# Patient Record
Sex: FEMALE | Race: BLACK OR AFRICAN AMERICAN | NOT HISPANIC OR LATINO | URBAN - METROPOLITAN AREA
[De-identification: names, ages, dates, MRNs, and addresses within clinical notes are randomized per-mention and may not be internally consistent; named-entity substitution may affect disease eponyms.]

---

## 2021-04-30 ENCOUNTER — OFFICE VISIT (OUTPATIENT)
Dept: URBAN - METROPOLITAN AREA CLINIC 31 | Facility: CLINIC | Age: 81
End: 2021-04-30

## 2021-04-30 VITALS
WEIGHT: 131 LBS | BODY MASS INDEX: 24.11 KG/M2 | SYSTOLIC BLOOD PRESSURE: 140 MMHG | HEIGHT: 62 IN | DIASTOLIC BLOOD PRESSURE: 70 MMHG | HEART RATE: 82 BPM | OXYGEN SATURATION: 96 %

## 2021-04-30 RX ORDER — MULTIVITAMIN WITH IRON
AS DIRECTED TABLET ORAL
Status: ACTIVE | COMMUNITY

## 2021-04-30 RX ORDER — AMLODIPINE BESYLATE 10 MG/1
1 TABLET TABLET ORAL ONCE A DAY
Qty: 30 | Status: ACTIVE | COMMUNITY

## 2021-04-30 RX ORDER — OMEPRAZOLE 20 MG/1
1 CAPSULE CAPSULE, DELAYED RELEASE ORAL ONCE A DAY
Qty: 90 CAPSULE | Status: ON HOLD | COMMUNITY

## 2021-04-30 RX ORDER — METRONIDAZOLE 7.5 MG/G
1 APPLICATION GEL TOPICAL TWICE A DAY
Status: ON HOLD | COMMUNITY

## 2021-04-30 NOTE — HPI-MIGRATED HPI
;     Colorectal Cancer Screening : Patient is a 80 year old female who presents today for consultation for a colonoscopy.  Patient denies a family history of colon polyps and cancers.  Patient currently admits 1 BM per day. Stools are normal without melena, blood, or mucus.   No abdominal pain. No weight loss No CP, SOB or fever. No blood thinners. No sleep apnea No cardiac or pulmonary issues    ;

## 2021-05-10 ENCOUNTER — OFFICE VISIT (OUTPATIENT)
Dept: URBAN - METROPOLITAN AREA SURGERY CENTER 8 | Facility: SURGERY CENTER | Age: 81
End: 2021-05-10

## 2021-05-17 ENCOUNTER — TELEPHONE ENCOUNTER (OUTPATIENT)
Dept: URBAN - METROPOLITAN AREA CLINIC 35 | Facility: CLINIC | Age: 81
End: 2021-05-17

## 2021-05-28 ENCOUNTER — OFFICE VISIT (OUTPATIENT)
Dept: URBAN - METROPOLITAN AREA CLINIC 31 | Facility: CLINIC | Age: 81
End: 2021-05-28

## 2022-05-10 ENCOUNTER — WEB ENCOUNTER (OUTPATIENT)
Dept: URBAN - METROPOLITAN AREA CLINIC 23 | Facility: CLINIC | Age: 82
End: 2022-05-10

## 2022-05-10 ENCOUNTER — LAB OUTSIDE AN ENCOUNTER (OUTPATIENT)
Dept: URBAN - METROPOLITAN AREA CLINIC 23 | Facility: CLINIC | Age: 82
End: 2022-05-10

## 2022-05-10 ENCOUNTER — OFFICE VISIT (OUTPATIENT)
Dept: URBAN - METROPOLITAN AREA CLINIC 23 | Facility: CLINIC | Age: 82
End: 2022-05-10
Payer: MEDICARE

## 2022-05-10 VITALS
WEIGHT: 139 LBS | HEIGHT: 62 IN | TEMPERATURE: 98 F | DIASTOLIC BLOOD PRESSURE: 80 MMHG | SYSTOLIC BLOOD PRESSURE: 173 MMHG | BODY MASS INDEX: 25.58 KG/M2 | HEART RATE: 87 BPM

## 2022-05-10 DIAGNOSIS — D50.9 IRON DEFICIENCY ANEMIA: ICD-10-CM

## 2022-05-10 DIAGNOSIS — K57.30 DIVERTICULOSIS OF COLON: ICD-10-CM

## 2022-05-10 PROBLEM — 733657002 DIVERTICULOSIS OF COLON: Status: ACTIVE | Noted: 2022-05-10

## 2022-05-10 PROCEDURE — 99213 OFFICE O/P EST LOW 20 MIN: CPT | Performed by: INTERNAL MEDICINE

## 2022-05-10 RX ORDER — MULTIVITAMIN WITH IRON
AS DIRECTED TABLET ORAL
Status: ACTIVE | COMMUNITY

## 2022-05-10 RX ORDER — AMLODIPINE BESYLATE 10 MG/1
1 TABLET TABLET ORAL ONCE A DAY
Qty: 30 | Status: ACTIVE | COMMUNITY

## 2022-05-10 NOTE — HPI-TODAY'S VISIT:
- 82 yo  female from [facility] who returns for follow-up; patient was last seen in our GI offices by Dr. Teresa Lopez in 4/2021 - On this occasion she is referred to see me by Dr. Barak Pickard for EGD, for further evaluation of iron deficiency anemia.  Labs done last month at Dr. Stratton office were notable for microcytic anemia with a Hb of 10.5, MCV 73.  Dr. Pickard had recommended IV iron infusions but the patients insurance denied authorization.  Hence Dr. Pickard referred the patient to [facility] to see if they can get it approved. - Patient denies hematochezia, melena, or hematemesis.  She had a negative colonoscopy done in 5/2021 with Dr. Teresa Lopez, with an excellent bowel preparation. - Denies family history of GI malignancies in any first degree relatives

## 2022-05-11 ENCOUNTER — CLAIMS CREATED FROM THE CLAIM WINDOW (OUTPATIENT)
Dept: URBAN - METROPOLITAN AREA CLINIC 4 | Facility: CLINIC | Age: 82
End: 2022-05-11
Payer: MEDICARE

## 2022-05-11 ENCOUNTER — TELEPHONE ENCOUNTER (OUTPATIENT)
Dept: URBAN - METROPOLITAN AREA CLINIC 92 | Facility: CLINIC | Age: 82
End: 2022-05-11

## 2022-05-11 ENCOUNTER — OFFICE VISIT (OUTPATIENT)
Dept: URBAN - METROPOLITAN AREA SURGERY CENTER 15 | Facility: SURGERY CENTER | Age: 82
End: 2022-05-11
Payer: MEDICARE

## 2022-05-11 DIAGNOSIS — K90.0 ACD (ADULT CELIAC DISEASE): ICD-10-CM

## 2022-05-11 DIAGNOSIS — K29.40 ATROPHIC GASTRITIS: ICD-10-CM

## 2022-05-11 DIAGNOSIS — K21.9 ACID REFLUX: ICD-10-CM

## 2022-05-11 DIAGNOSIS — K31.89 FOCAL FOVEOLAR HYPERPLASIA: ICD-10-CM

## 2022-05-11 DIAGNOSIS — K21.9 GASTRO-ESOPHAGEAL REFLUX DISEASE WITHOUT ESOPHAGITIS: ICD-10-CM

## 2022-05-11 DIAGNOSIS — K31.A15 GASTRIC INTESTINAL METAPLASIA WITHOUT DYSPLASIA, INVOLVING MULTIPLE SITES: ICD-10-CM

## 2022-05-11 DIAGNOSIS — D50.9 ANEMIA: ICD-10-CM

## 2022-05-11 DIAGNOSIS — K90.0 CELIAC DISEASE: ICD-10-CM

## 2022-05-11 PROBLEM — 40719004 EROSIVE ESOPHAGITIS: Status: ACTIVE | Noted: 2022-05-11

## 2022-05-11 PROCEDURE — 88342 IMHCHEM/IMCYTCHM 1ST ANTB: CPT | Performed by: PATHOLOGY

## 2022-05-11 PROCEDURE — 43239 EGD BIOPSY SINGLE/MULTIPLE: CPT | Performed by: INTERNAL MEDICINE

## 2022-05-11 PROCEDURE — G8907 PT DOC NO EVENTS ON DISCHARG: HCPCS | Performed by: INTERNAL MEDICINE

## 2022-05-11 PROCEDURE — 88305 TISSUE EXAM BY PATHOLOGIST: CPT | Performed by: PATHOLOGY

## 2022-05-11 RX ORDER — AMLODIPINE BESYLATE 10 MG/1
1 TABLET TABLET ORAL ONCE A DAY
Qty: 30 | Status: ACTIVE | COMMUNITY

## 2022-05-11 RX ORDER — MULTIVITAMIN WITH IRON
AS DIRECTED TABLET ORAL
Status: ACTIVE | COMMUNITY

## 2022-05-13 LAB — OCCULT BLOOD, FECAL, IA: NEGATIVE

## 2022-05-15 NOTE — PHYSICAL EXAM NECK/THYROID:
normal appearance , without tenderness upon palpation , no deformities , trachea midline , Thyroid normal size , no thyroid nodules , no masses , no JVD , thyroid nontender
74

## 2022-05-17 ENCOUNTER — TELEPHONE ENCOUNTER (OUTPATIENT)
Dept: URBAN - METROPOLITAN AREA CLINIC 92 | Facility: CLINIC | Age: 82
End: 2022-05-17

## 2022-05-19 ENCOUNTER — WEB ENCOUNTER (OUTPATIENT)
Dept: URBAN - METROPOLITAN AREA CLINIC 23 | Facility: CLINIC | Age: 82
End: 2022-05-19

## 2022-05-23 ENCOUNTER — WEB ENCOUNTER (OUTPATIENT)
Dept: URBAN - METROPOLITAN AREA CLINIC 23 | Facility: CLINIC | Age: 82
End: 2022-05-23

## 2022-07-07 ENCOUNTER — OFFICE VISIT (OUTPATIENT)
Dept: URBAN - METROPOLITAN AREA CLINIC 23 | Facility: CLINIC | Age: 82
End: 2022-07-07
Payer: MEDICARE

## 2022-07-07 VITALS
HEIGHT: 62 IN | HEART RATE: 83 BPM | SYSTOLIC BLOOD PRESSURE: 160 MMHG | DIASTOLIC BLOOD PRESSURE: 79 MMHG | TEMPERATURE: 97.3 F | WEIGHT: 136.6 LBS | BODY MASS INDEX: 25.14 KG/M2

## 2022-07-07 DIAGNOSIS — C85.10 B-CELL LYMPHOMA, UNSPECIFIED B-CELL LYMPHOMA TYPE, UNSPECIFIED BODY REGION: ICD-10-CM

## 2022-07-07 DIAGNOSIS — K31.89 INTESTINAL METAPLASIA OF GASTRIC MUCOSA: ICD-10-CM

## 2022-07-07 DIAGNOSIS — D64.9 ANEMIA, UNSPECIFIED TYPE: ICD-10-CM

## 2022-07-07 DIAGNOSIS — R89.7 ABNORMAL SMALL BOWEL BIOPSY: ICD-10-CM

## 2022-07-07 DIAGNOSIS — D50.9 IRON DEFICIENCY ANEMIA: ICD-10-CM

## 2022-07-07 DIAGNOSIS — K21.9 GERD: ICD-10-CM

## 2022-07-07 DIAGNOSIS — R14.0 ABDOMINAL BLOATING: ICD-10-CM

## 2022-07-07 PROCEDURE — 99214 OFFICE O/P EST MOD 30 MIN: CPT | Performed by: INTERNAL MEDICINE

## 2022-07-07 RX ORDER — MULTIVITAMIN WITH IRON
AS DIRECTED TABLET ORAL
Status: ACTIVE | COMMUNITY

## 2022-07-07 RX ORDER — AMLODIPINE BESYLATE 10 MG/1
1 TABLET TABLET ORAL ONCE A DAY
Qty: 30 | Status: ACTIVE | COMMUNITY

## 2022-07-07 NOTE — HPI-TODAY'S VISIT:
- 80 yo  female from [facility] who returns for follow-up - EGD which I performed 2 months ago was notable for LA grade A reflux esophagitis without evidence of Prabhakar's esophagus, a medium-sized hiatal hernia, gastric intestinal metaplasia, and duodenal villous blunting on duodenal biopsies. - Since the above, she has been taking pantoprazole 20 mg daily.  She previously did not notice heartburn symptoms but she had a lot of throat clearing and this is now better on the pantoprazole. - She denies a prior history of H. pylori infection  - She is taking oral iron therapy for her iron deficiency anemia.  Recent fecal immunochemical test (FIT) was negative. - Notes occasional gas and abdominal bloating - I have discussed with the patient the findings of previous endoscopic procedures, pathology results, and lab results.  All questions were answered to their satisfaction.

## 2022-07-20 ENCOUNTER — TELEPHONE ENCOUNTER (OUTPATIENT)
Dept: URBAN - METROPOLITAN AREA CLINIC 23 | Facility: CLINIC | Age: 82
End: 2022-07-20

## 2022-08-02 ENCOUNTER — TELEPHONE ENCOUNTER (OUTPATIENT)
Dept: URBAN - METROPOLITAN AREA CLINIC 23 | Facility: CLINIC | Age: 82
End: 2022-08-02

## 2022-08-04 ENCOUNTER — LAB OUTSIDE AN ENCOUNTER (OUTPATIENT)
Dept: URBAN - METROPOLITAN AREA CLINIC 23 | Facility: CLINIC | Age: 82
End: 2022-08-04

## 2022-08-09 LAB
% SATURATION: 12
A/G RATIO: 1.1
ABSOLUTE BASOPHILS: 145
ABSOLUTE BASOPHILS: 145
ABSOLUTE EOSINOPHILS: 725
ABSOLUTE EOSINOPHILS: 725
ABSOLUTE LYMPHOCYTES: 9715
ABSOLUTE LYMPHOCYTES: 9715
ABSOLUTE MONOCYTES: 580
ABSOLUTE MONOCYTES: 580
ABSOLUTE NEUTROPHILS: 3335
ABSOLUTE NEUTROPHILS: 3335
ALBUMIN: 4
ALKALINE PHOSPHATASE: 93
ALT (SGPT): 12
AST (SGOT): 22
BASOPHILS: 1
BASOPHILS: 1
BILIRUBIN, TOTAL: 0.5
BUN/CREATININE RATIO: 19
BUN: 22
CALCIUM: 9.2
CARBON DIOXIDE, TOTAL: 23
CHLORIDE: 100
COMMENT(S): (no result)
CREATININE: 1.15
EGFR: 48
EOSINOPHILS: 5
EOSINOPHILS: 5
FERRITIN: 108
FOLATE (FOLIC ACID), SERUM: 21.8
GLOBULIN, TOTAL: 3.7
GLUCOSE: 84
HEMATOCRIT: 33.3
HEMOGLOBIN: 10.1
IMMUNOGLOBULIN A: 167
INTERPRETATION: (no result)
INTRINSIC FACTOR BLOCKING: NEGATIVE
IRON BINDING CAPACITY: 278
IRON, TOTAL: 32
LYMPHOCYTES: 67
LYMPHOCYTES: 67
MCH: 21.7
MCHC: 30.3
MCV: 71.6
MONOCYTES: 4
MONOCYTES: 4
MPV: 9.6
NEUTROPHILS: 23
NEUTROPHILS: 23
NOTE: (no result)
NOTE: (no result)
PARIETAL CELL AB SCREEN: NEGATIVE
PLATELET COUNT: 259
POTASSIUM: 4
PROTEIN, TOTAL: 7.7
RDW: 18.8
RED BLOOD CELL COUNT: 4.65
RETICULOCYTE COUNT: 1.7
RETICULOCYTE, ABSOLUTE: (no result)
SODIUM: 137
TISSUE TRANSGLUTAMINASE AB, IGA: <1
VITAMIN B12: 1778
WHITE BLOOD CELL COUNT: 14.5

## 2022-08-31 ENCOUNTER — LAB OUTSIDE AN ENCOUNTER (OUTPATIENT)
Dept: URBAN - METROPOLITAN AREA CLINIC 111 | Facility: CLINIC | Age: 82
End: 2022-08-31

## 2022-08-31 ENCOUNTER — OFFICE VISIT (OUTPATIENT)
Dept: URBAN - METROPOLITAN AREA CLINIC 111 | Facility: CLINIC | Age: 82
End: 2022-08-31
Payer: MEDICARE

## 2022-08-31 VITALS
BODY MASS INDEX: 25.28 KG/M2 | TEMPERATURE: 97.5 F | HEIGHT: 62 IN | SYSTOLIC BLOOD PRESSURE: 148 MMHG | HEART RATE: 87 BPM | WEIGHT: 137.4 LBS | DIASTOLIC BLOOD PRESSURE: 69 MMHG

## 2022-08-31 DIAGNOSIS — K31.89 INTESTINAL METAPLASIA OF GASTRIC MUCOSA: ICD-10-CM

## 2022-08-31 DIAGNOSIS — R89.7 ABNORMAL SMALL BOWEL BIOPSY: ICD-10-CM

## 2022-08-31 DIAGNOSIS — K92.2 GI BLEEDING: ICD-10-CM

## 2022-08-31 DIAGNOSIS — C85.10 B-CELL LYMPHOMA, UNSPECIFIED B-CELL LYMPHOMA TYPE, UNSPECIFIED BODY REGION: ICD-10-CM

## 2022-08-31 DIAGNOSIS — R14.0 ABDOMINAL BLOATING: ICD-10-CM

## 2022-08-31 DIAGNOSIS — K92.1 HEMATOCHEZIA: ICD-10-CM

## 2022-08-31 DIAGNOSIS — K64.8 INTERNAL HEMORRHOIDS: ICD-10-CM

## 2022-08-31 DIAGNOSIS — K63.89 SMALL INTESTINAL BACTERIAL OVERGROWTH (SIBO): ICD-10-CM

## 2022-08-31 DIAGNOSIS — D50.9 IRON DEFICIENCY ANEMIA: ICD-10-CM

## 2022-08-31 DIAGNOSIS — K57.90 DIVERTICULOSIS: ICD-10-CM

## 2022-08-31 PROCEDURE — 99214 OFFICE O/P EST MOD 30 MIN: CPT | Performed by: INTERNAL MEDICINE

## 2022-08-31 RX ORDER — AMOXICILLIN AND CLAVULANATE POTASSIUM 875; 125 MG/1; MG/1
1 TABLET TABLET, FILM COATED ORAL
Qty: 20 TABLETS | Refills: 0 | OUTPATIENT
Start: 2022-08-31 | End: 2022-09-10

## 2022-08-31 RX ORDER — MULTIVITAMIN WITH IRON
AS DIRECTED TABLET ORAL
Status: ACTIVE | COMMUNITY

## 2022-08-31 RX ORDER — AMLODIPINE BESYLATE 10 MG/1
1 TABLET TABLET ORAL ONCE A DAY
Qty: 30 | Status: ACTIVE | COMMUNITY

## 2022-08-31 NOTE — HPI-TODAY'S VISIT:
- 82 yo  female from [facility] who returns for follow-up - She reports 2 isolated episodes of painless hematochezia 2 days ago and previously 4 days ago.  She described a large amount of bright red blood per rectum in the toilet.  Happened only twice.  She had never had hematochezia in the past.  Denies diarrhea.  She has soft stools every morning, including earlier today. - She has iron deficiency anemia and she is receiving IV iron infusions through Dr. Pickard's office - EGD 3 months ago was negative for a source of GI bleeding.  Colonoscopy last year was only notable for hemorrhoids and left-sided colonic diverticulosis. - Recent serology was negative for celiac disease and negative for any serologic evidence of autoimmune gastritis - Recent hydrogen breath test was positive for small intestinal bacterial overgrowth (SIBO) - No acid reflux symptoms since stopping pantoprazole  - I have discussed with the patient the findings of previous endoscopic procedures, pathology results, labs, and radiology results.  All questions were answered to their satisfaction.

## 2022-09-22 ENCOUNTER — OFFICE VISIT (OUTPATIENT)
Dept: URBAN - METROPOLITAN AREA CLINIC 22 | Facility: CLINIC | Age: 82
End: 2022-09-22
Payer: MEDICARE

## 2022-09-22 DIAGNOSIS — D50.9 ANEMIA: ICD-10-CM

## 2022-09-22 DIAGNOSIS — K92.1 ACUTE MELENA: ICD-10-CM

## 2022-09-22 PROCEDURE — 91110 GI TRC IMG INTRAL ESOPH-ILE: CPT | Performed by: INTERNAL MEDICINE

## 2022-09-22 RX ORDER — AMLODIPINE BESYLATE 10 MG/1
1 TABLET TABLET ORAL ONCE A DAY
Qty: 30 | Status: ACTIVE | COMMUNITY

## 2022-09-22 RX ORDER — MULTIVITAMIN WITH IRON
AS DIRECTED TABLET ORAL
Status: ACTIVE | COMMUNITY

## 2022-09-23 ENCOUNTER — TELEPHONE ENCOUNTER (OUTPATIENT)
Dept: URBAN - METROPOLITAN AREA SURGERY CENTER 15 | Facility: SURGERY CENTER | Age: 82
End: 2022-09-23

## 2022-10-06 ENCOUNTER — OFFICE VISIT (OUTPATIENT)
Dept: URBAN - METROPOLITAN AREA CLINIC 23 | Facility: CLINIC | Age: 82
End: 2022-10-06
Payer: MEDICARE

## 2022-10-06 VITALS
DIASTOLIC BLOOD PRESSURE: 89 MMHG | BODY MASS INDEX: 25.14 KG/M2 | HEIGHT: 62 IN | TEMPERATURE: 97.2 F | WEIGHT: 136.6 LBS | SYSTOLIC BLOOD PRESSURE: 170 MMHG | HEART RATE: 79 BPM

## 2022-10-06 DIAGNOSIS — K64.8 INTERNAL HEMORRHOIDS: ICD-10-CM

## 2022-10-06 DIAGNOSIS — K29.40 CHRONIC ATROPHIC GASTRITIS: ICD-10-CM

## 2022-10-06 DIAGNOSIS — D50.9 IRON DEFICIENCY ANEMIA: ICD-10-CM

## 2022-10-06 DIAGNOSIS — C85.10 B-CELL LYMPHOMA, UNSPECIFIED B-CELL LYMPHOMA TYPE, UNSPECIFIED BODY REGION: ICD-10-CM

## 2022-10-06 DIAGNOSIS — K31.89 INTESTINAL METAPLASIA OF GASTRIC MUCOSA: ICD-10-CM

## 2022-10-06 DIAGNOSIS — K92.2 GI BLEEDING: ICD-10-CM

## 2022-10-06 DIAGNOSIS — K57.90 DIVERTICULOSIS: ICD-10-CM

## 2022-10-06 DIAGNOSIS — K92.1 HEMATOCHEZIA: ICD-10-CM

## 2022-10-06 PROCEDURE — 99214 OFFICE O/P EST MOD 30 MIN: CPT | Performed by: INTERNAL MEDICINE

## 2022-10-06 RX ORDER — AMLODIPINE BESYLATE 10 MG/1
1 TABLET TABLET ORAL ONCE A DAY
Qty: 30 | Status: ACTIVE | COMMUNITY

## 2022-10-06 RX ORDER — MULTIVITAMIN WITH IRON
AS DIRECTED TABLET ORAL
Status: ACTIVE | COMMUNITY

## 2022-10-06 NOTE — HPI-TODAY'S VISIT:
- 80 yo female from [facility] who returns for follow-up - She completed her 10-day course of Augmentin for treatment of SIBO and she no longer has abdominal bloating - Recent PillCam was negative for any sources of bleeding in her small intestine - She has not yet done the Meckel's bleeding scan at [facility] - States she has not had any further episodes of hematochezia since late August of the present year - I have discussed with the patient the findings of previous endoscopic procedures, pathology results, labs, and radiology results.  All questions were answered to their satisfaction.  8/31/22:  - 80 yo  female from [facility] who returns for follow-up - She reports 2 isolated episodes of painless hematochezia 2 days ago and previously 4 days ago.  She described a large amount of bright red blood per rectum in the toilet.  Happened only twice.  She had never had hematochezia in the past.  Denies diarrhea.  She has soft stools every morning, including earlier today. - She has iron deficiency anemia and she is receiving IV iron infusions through Dr. Pickard's office - EGD 3 months ago was negative for a source of GI bleeding.  Colonoscopy last year was only notable for hemorrhoids and left-sided colonic diverticulosis. - Recent serology was negative for celiac disease and negative for any serologic evidence of autoimmune gastritis - Recent hydrogen breath test was positive for small intestinal bacterial overgrowth (SIBO) - No acid reflux symptoms since stopping pantoprazole

## 2022-10-11 ENCOUNTER — OFFICE VISIT (OUTPATIENT)
Dept: URBAN - METROPOLITAN AREA CLINIC 23 | Facility: CLINIC | Age: 82
End: 2022-10-11

## 2022-10-20 PROBLEM — 87522002 IRON DEFICIENCY ANEMIA: Status: ACTIVE | Noted: 2022-05-10

## 2022-10-20 PROBLEM — 398050005 DIVERTICULAR DISEASE OF COLON: Status: ACTIVE | Noted: 2022-08-31

## 2022-10-20 PROBLEM — 84568007 CHRONIC ATROPHIC GASTRITIS: Status: ACTIVE | Noted: 2022-10-20

## 2022-10-20 PROBLEM — 109979007: Status: ACTIVE | Noted: 2022-07-07

## 2022-10-31 ENCOUNTER — LAB OUTSIDE AN ENCOUNTER (OUTPATIENT)
Dept: URBAN - METROPOLITAN AREA CLINIC 23 | Facility: CLINIC | Age: 82
End: 2022-10-31

## 2023-06-21 ENCOUNTER — LAB OUTSIDE AN ENCOUNTER (OUTPATIENT)
Dept: URBAN - METROPOLITAN AREA CLINIC 111 | Facility: CLINIC | Age: 83
End: 2023-06-21

## 2023-06-21 ENCOUNTER — OFFICE VISIT (OUTPATIENT)
Dept: URBAN - METROPOLITAN AREA CLINIC 111 | Facility: CLINIC | Age: 83
End: 2023-06-21
Payer: MEDICARE

## 2023-06-21 VITALS
WEIGHT: 129.4 LBS | DIASTOLIC BLOOD PRESSURE: 73 MMHG | SYSTOLIC BLOOD PRESSURE: 145 MMHG | TEMPERATURE: 97.3 F | BODY MASS INDEX: 23.81 KG/M2 | HEART RATE: 85 BPM | HEIGHT: 62 IN

## 2023-06-21 DIAGNOSIS — K29.40 CHRONIC ATROPHIC GASTRITIS: ICD-10-CM

## 2023-06-21 DIAGNOSIS — K31.89 INTESTINAL METAPLASIA OF GASTRIC MUCOSA: ICD-10-CM

## 2023-06-21 DIAGNOSIS — D50.9 IRON DEFICIENCY ANEMIA: ICD-10-CM

## 2023-06-21 DIAGNOSIS — R68.89 THROAT CLEARING: ICD-10-CM

## 2023-06-21 DIAGNOSIS — C85.10 B-CELL LYMPHOMA, UNSPECIFIED B-CELL LYMPHOMA TYPE, UNSPECIFIED BODY REGION: ICD-10-CM

## 2023-06-21 DIAGNOSIS — K57.90 DIVERTICULOSIS: ICD-10-CM

## 2023-06-21 DIAGNOSIS — R12 HEARTBURN: ICD-10-CM

## 2023-06-21 PROCEDURE — 99214 OFFICE O/P EST MOD 30 MIN: CPT | Performed by: INTERNAL MEDICINE

## 2023-06-21 RX ORDER — MULTIVITAMIN WITH IRON
AS DIRECTED TABLET ORAL
Status: ACTIVE | COMMUNITY

## 2023-06-21 RX ORDER — AMLODIPINE BESYLATE 10 MG/1
1 TABLET TABLET ORAL ONCE A DAY
Qty: 30 | Status: ACTIVE | COMMUNITY

## 2023-06-21 NOTE — HPI-TODAY'S VISIT:
- 81 yo  female from [facility] who returns for follow-up; I last saw her in October 2022 - Over the past 1 month has been experiencing intermittent heartburn, throat burning, and throat clearing.  She is not taking anything for these symptoms. - She has iron deficiency anemia and she is receiving IV iron infusions through Dr. Pickard's office - Recent colonoscopy and EGD were negative for a source of GI blood loss - She does have chronic atrophic gastritis with history of gastric intestinal metaplasia.  Previous serology was negative for celiac disease and negative for any serologic evidence of autoimmune gastritis. - I have discussed with the patient the findings of previous endoscopic procedures, pathology results, labs, and radiology results.  All questions were answered to their satisfaction.

## 2023-07-03 ENCOUNTER — TELEPHONE ENCOUNTER (OUTPATIENT)
Dept: URBAN - METROPOLITAN AREA CLINIC 23 | Facility: CLINIC | Age: 83
End: 2023-07-03

## 2023-07-03 ENCOUNTER — OFFICE VISIT (OUTPATIENT)
Dept: URBAN - METROPOLITAN AREA LAB 3 | Facility: LAB | Age: 83
End: 2023-07-03
Payer: MEDICARE

## 2023-07-03 DIAGNOSIS — K29.60 ADENOPAPILLOMATOSIS GASTRICA: ICD-10-CM

## 2023-07-03 DIAGNOSIS — K21.00 ALKALINE REFLUX ESOPHAGITIS: ICD-10-CM

## 2023-07-03 DIAGNOSIS — K31.7 BENIGN GASTRIC POLYP: ICD-10-CM

## 2023-07-03 DIAGNOSIS — K31.A11 INTESTINAL METAPLASIA OF ANTRUM OF STOMACH WITHOUT DYSPLASIA: ICD-10-CM

## 2023-07-03 PROCEDURE — 43239 EGD BIOPSY SINGLE/MULTIPLE: CPT | Performed by: INTERNAL MEDICINE

## 2023-07-03 RX ORDER — FAMOTIDINE 40 MG/1
1 TABLET, 30 MINUTES BEFORE DINNER TABLET ORAL ONCE A DAY
Qty: 90 | Refills: 3 | OUTPATIENT
Start: 2023-07-03

## 2023-07-03 RX ORDER — MULTIVITAMIN WITH IRON
AS DIRECTED TABLET ORAL
Status: ACTIVE | COMMUNITY

## 2023-07-03 RX ORDER — AMLODIPINE BESYLATE 10 MG/1
1 TABLET TABLET ORAL ONCE A DAY
Qty: 30 | Status: ACTIVE | COMMUNITY

## 2023-07-03 RX ORDER — OMEPRAZOLE 40 MG/1
1 CAPSULE 30 MINUTES BEFORE BREAKFAST CAPSULE, DELAYED RELEASE ORAL ONCE A DAY
Qty: 90 | Refills: 3 | OUTPATIENT
Start: 2023-07-03

## 2023-09-06 ENCOUNTER — DASHBOARD ENCOUNTERS (OUTPATIENT)
Age: 83
End: 2023-09-06

## 2023-09-06 ENCOUNTER — OFFICE VISIT (OUTPATIENT)
Dept: URBAN - METROPOLITAN AREA CLINIC 111 | Facility: CLINIC | Age: 83
End: 2023-09-06
Payer: MEDICARE

## 2023-09-06 VITALS
BODY MASS INDEX: 24.14 KG/M2 | HEIGHT: 62 IN | HEART RATE: 85 BPM | TEMPERATURE: 97.3 F | WEIGHT: 131.2 LBS | DIASTOLIC BLOOD PRESSURE: 75 MMHG | SYSTOLIC BLOOD PRESSURE: 144 MMHG

## 2023-09-06 DIAGNOSIS — K29.40 CHRONIC ATROPHIC GASTRITIS: ICD-10-CM

## 2023-09-06 DIAGNOSIS — K21.9 CHRONIC GERD: ICD-10-CM

## 2023-09-06 DIAGNOSIS — K57.90 DIVERTICULOSIS: ICD-10-CM

## 2023-09-06 DIAGNOSIS — K44.9 HIATAL HERNIA: ICD-10-CM

## 2023-09-06 DIAGNOSIS — K31.89 INTESTINAL METAPLASIA OF GASTRIC MUCOSA: ICD-10-CM

## 2023-09-06 DIAGNOSIS — D50.9 IRON DEFICIENCY ANEMIA: ICD-10-CM

## 2023-09-06 DIAGNOSIS — C85.10 B-CELL LYMPHOMA, UNSPECIFIED B-CELL LYMPHOMA TYPE, UNSPECIFIED BODY REGION: ICD-10-CM

## 2023-09-06 PROCEDURE — 99214 OFFICE O/P EST MOD 30 MIN: CPT | Performed by: INTERNAL MEDICINE

## 2023-09-06 RX ORDER — MULTIVITAMIN WITH IRON
AS DIRECTED TABLET ORAL
Status: ACTIVE | COMMUNITY

## 2023-09-06 RX ORDER — FAMOTIDINE 40 MG/1
1 TABLET, 30 MINUTES BEFORE DINNER TABLET ORAL ONCE A DAY
Qty: 90 | Refills: 3 | Status: ACTIVE | COMMUNITY
Start: 2023-07-03

## 2023-09-06 RX ORDER — AMLODIPINE BESYLATE 10 MG/1
1 TABLET TABLET ORAL ONCE A DAY
Qty: 30 | Status: ACTIVE | COMMUNITY

## 2023-09-06 RX ORDER — OMEPRAZOLE 40 MG/1
1 CAPSULE 30 MINUTES BEFORE BREAKFAST CAPSULE, DELAYED RELEASE ORAL ONCE A DAY
Qty: 90 | Refills: 3 | Status: ACTIVE | COMMUNITY
Start: 2023-07-03

## 2023-09-06 NOTE — PHYSICAL EXAM HENT:
Head, normocephalic, atraumatic, Face, Face within normal limits, Ears, External ears within normal limits, Nose/Nasopharynx, External nose normal appearance, nares patent, no nasal discharge, Mouth and Throat, Oral cavity appearance normal, Lips, Appearance normal Jenn ROSARIO

## 2023-09-06 NOTE — HPI-TODAY'S VISIT:
- 81 yo  female from [facility] who returns for follow-up - Patient has chronic GERD with history of a medium-sized hiatal hernia and recurrent LA grade A reflux esophagitis, without prior evidence of Prabhakar's esophagus - She is doing well on pantoprazole 40 mg every morning and famotidine 40 mg at bedtime, with good control of acid reflux symptoms.  Lower doses of acid suppressants did not work well for her, as she had endoscopic evidence of reflux esophagitis. - She also has chronic atrophic gastritis of environmental etiology  - Denies GI complaints today - I have discussed with the patient the findings of previous endoscopic procedures and pathology results.  All questions were answered to their satisfaction.

## 2023-09-14 ENCOUNTER — LAB OUTSIDE AN ENCOUNTER (OUTPATIENT)
Dept: URBAN - METROPOLITAN AREA CLINIC 23 | Facility: CLINIC | Age: 83
End: 2023-09-14

## 2023-09-17 LAB
H PYLORI, IGM ABS: <9
H. PYLORI, IGA ABS: <9
H. PYLORI, IGG ABS: 0.9

## 2025-06-27 ENCOUNTER — LAB OUTSIDE AN ENCOUNTER (OUTPATIENT)
Dept: URBAN - METROPOLITAN AREA CLINIC 111 | Facility: CLINIC | Age: 85
End: 2025-06-27

## 2025-06-27 ENCOUNTER — OFFICE VISIT (OUTPATIENT)
Dept: URBAN - METROPOLITAN AREA CLINIC 111 | Facility: CLINIC | Age: 85
End: 2025-06-27
Payer: MEDICARE

## 2025-06-27 DIAGNOSIS — K21.9 GERD: ICD-10-CM

## 2025-06-27 DIAGNOSIS — K29.40 CHRONIC ATROPHIC GASTRITIS: ICD-10-CM

## 2025-06-27 DIAGNOSIS — K31.89 INTESTINAL METAPLASIA OF GASTRIC MUCOSA: ICD-10-CM

## 2025-06-27 DIAGNOSIS — R10.30 LOWER ABDOMINAL PAIN: ICD-10-CM

## 2025-06-27 PROCEDURE — 99214 OFFICE O/P EST MOD 30 MIN: CPT | Performed by: PHYSICIAN ASSISTANT

## 2025-06-27 RX ORDER — AMLODIPINE BESYLATE 10 MG/1
1 TABLET TABLET ORAL ONCE A DAY
Qty: 30 | Status: ACTIVE | COMMUNITY

## 2025-06-27 RX ORDER — OMEPRAZOLE 40 MG/1
1 CAPSULE 30 MINUTES BEFORE BREAKFAST CAPSULE, DELAYED RELEASE ORAL ONCE A DAY
Qty: 90 | Refills: 3 | Status: ON HOLD | COMMUNITY
Start: 2023-07-03

## 2025-06-27 RX ORDER — PANTOPRAZOLE SODIUM 20 MG/1
1 TABLET 1/2 TO 1 HOUR BEFORE MORNING MEAL TABLET, DELAYED RELEASE ORAL ONCE A DAY
Qty: 90 TABLET | Refills: 1 | OUTPATIENT
Start: 2025-06-27

## 2025-06-27 RX ORDER — MULTIVITAMIN WITH IRON
AS DIRECTED TABLET ORAL
Status: ACTIVE | COMMUNITY

## 2025-06-27 RX ORDER — FAMOTIDINE 40 MG/1
1 TABLET, 30 MINUTES BEFORE DINNER TABLET ORAL ONCE A DAY
Qty: 90 | Refills: 3 | Status: ON HOLD | COMMUNITY
Start: 2023-07-03

## 2025-06-27 NOTE — HPI-TODAY'S VISIT:
84-year-old female here with abdominal pain. Seen in 2023 by Dr. Mullins for chronic GERD, hiatal hernia, chronic atrophic gastritis, intestinal metaplasia of gastric mucosa, diverticulosis, iron deficiency anemia, and B-cell lymphoma.  She was told to continue pantoprazole and famotidine.  Repeat EGD was recommended in 2 to 3 years.  Patient had H. pylori labs ordered which was negative.  Colonoscopy in 2021 by Dr. Lopez revealing 1 small polyp, lipomatous, diverticulosis, and internal hemorrhoids.  Path revealing mucosal prolapse type polyp.  No repeat colonoscopies recommended due to age.  Last EGD in July 2023 revealing LA grade a reflux esophagitis, medium sized hiatal hernia, gastric mucosal atrophy, few small gastric polyps, and mild mucosal changes in gastric antrum.  Path revealing chronic gastritis with intestinal metaplasia.  No H. pylori.  Fundic gland polyps and reflux esophagitis noted.  No Prabhakar's.  She has had some cramping in lower abdomen and back. Symptoms started 6 months ago. Pain is not there all the time but reports pain was worse last night. No recent imaging. No significant weight loss. Stretching can worsen the pain. Eating does not worsen it. No FH of GI cancer. She is stooling once a day. No rectal bleeding. She is no longer taking PPI and Famotidine. She had a lot of heartburn yesterday but it is not happening often. No h/o abdominal surgeries.

## 2025-06-30 ENCOUNTER — TELEPHONE ENCOUNTER (OUTPATIENT)
Dept: URBAN - METROPOLITAN AREA CLINIC 111 | Facility: CLINIC | Age: 85
End: 2025-06-30

## 2025-07-21 ENCOUNTER — TELEPHONE ENCOUNTER (OUTPATIENT)
Dept: URBAN - METROPOLITAN AREA CLINIC 111 | Facility: CLINIC | Age: 85
End: 2025-07-21